# Patient Record
Sex: MALE | Race: WHITE
[De-identification: names, ages, dates, MRNs, and addresses within clinical notes are randomized per-mention and may not be internally consistent; named-entity substitution may affect disease eponyms.]

---

## 2017-11-14 ENCOUNTER — HOSPITAL ENCOUNTER (EMERGENCY)
Dept: HOSPITAL 63 - ER | Age: 47
Discharge: HOME | End: 2017-11-14
Payer: COMMERCIAL

## 2017-11-14 VITALS
SYSTOLIC BLOOD PRESSURE: 137 MMHG | DIASTOLIC BLOOD PRESSURE: 91 MMHG | SYSTOLIC BLOOD PRESSURE: 137 MMHG | DIASTOLIC BLOOD PRESSURE: 91 MMHG | SYSTOLIC BLOOD PRESSURE: 137 MMHG | DIASTOLIC BLOOD PRESSURE: 91 MMHG | DIASTOLIC BLOOD PRESSURE: 91 MMHG | SYSTOLIC BLOOD PRESSURE: 137 MMHG | DIASTOLIC BLOOD PRESSURE: 91 MMHG | SYSTOLIC BLOOD PRESSURE: 137 MMHG

## 2017-11-14 VITALS — WEIGHT: 200 LBS | HEIGHT: 72 IN | BODY MASS INDEX: 27.09 KG/M2

## 2017-11-14 DIAGNOSIS — J45.901: Primary | ICD-10-CM

## 2017-11-14 DIAGNOSIS — M25.552: ICD-10-CM

## 2017-11-14 DIAGNOSIS — G89.29: ICD-10-CM

## 2017-11-14 DIAGNOSIS — F17.210: ICD-10-CM

## 2017-11-14 PROCEDURE — 94640 AIRWAY INHALATION TREATMENT: CPT

## 2017-11-14 PROCEDURE — 99284 EMERGENCY DEPT VISIT MOD MDM: CPT

## 2017-11-14 NOTE — PHYS DOC
General


Chief Complaint:  MULTIPLE COMPLAINTS


Stated Complaint:  HIP PAIN  ASTHMA ATTACK


Time Seen by MD:  04:30


Source:  patient


Exam Limitations:  no limitations


Problems:  





History of Present Illness


Initial Comments


Patient is a 46-year-old male who comes to the ED complaining of asthma attack 

and hip pain.


Patient states he moved here from [place] in April, states he has not 

established with a doctor since moving here. He works as a  and cannot 

take sedative medications.


Patient has a nebulizer machine at home, states when his symptoms are like this 

he can usually get a breathing treatment which will help. He has no medications 

at home therefore he has come here for evaluation. He denies fever chills 

sweats or body aches cough is productive with whitish sputum. Symptoms are 

consistent with prior asthma attacks. Patient still smokes cigarettes heavily.


Patient also complains of left hip pain. While talking patient pushes on his 

left buttock and then cries out in pain. He denies any trauma or recent 

exacerbating activities. He says that he rolls over on his hip and it wakes him 

up at night. No numbness tingling weakness or radiating symptoms. No pre-

arrival treatment. ED vital signs are stable


Timing/Duration:  other


Severity:  mild


Modifying Factors:  improves with medication, worse with movement, improves 

with rest


Associated Symptoms:  cough, shortness of breath, other


Allergies:  


Coded Allergies:  


     No Known Drug Allergies (Unverified , 11/14/17)





Past Medical History


Medical History:  arthritis, asthma


Surgical History:  noncontributory





Social History


Smoker:  cigarettes


Alcohol:  none


Drugs:  none





Review of Systems


Constitutional:  denies chills, denies diaphoresis, denies fever


Respiratory:  see HPI


Cardiovascular:  denies chest pain, denies palpitations, denies syncope


Gastrointestinal:  denies diarrhea, denies nausea, denies vomiting


Musculoskeletal:  see HPI, denies back pain, joint pain, denies joint swelling, 

muscle pain, denies neck pain


Psychiatric/Neurological:  denies headache, denies numbness, denies paresthesia


Hematologic/Lymphatic:  denies blood clots, denies easy bleeding, denies easy 

bruising





Physical Exam


General Appearance:  no apparent distress (disheveled, smells like smoke)


Eyes:  bilateral eye normal inspection, bilateral eye PERRL, bilateral eye EOMI


Ear, Nose, Throat:  hearing grossly normal, normal ENT inspection, normal 

pharynx


Neck:  non-tender, supple


Respiratory:  chest non-tender, decreased breath sounds (globally with fair to 

good air movement no respiratory distress)


Cardiovascular:  normal peripheral pulses, regular rate, rhythm


Back:  no CVA tenderness, no vertebral tenderness


Extremities:  normal range of motion, no pedal edema, no calf tenderness, 

pelvis stable, other (tenderness at the lateral left gluteus muscle)


Neurologic/Psychiatric:  CNs II-XII nml as tested, no motor/sensory deficits, 

alert, normal mood/affect, oriented x 3


Skin:  normal color, warm/dry





Orders, Labs, Meds


DuoNeb improvement in patient's symptoms according to his statements.


I discussed over-the-counter medications smoking cessation establishment with 

PCP nebulizer treatments and prednisone. Prednisone should be effective in a 

heating patient's respiratory symptoms and chronic inflammatory left hip 

complaint. Signs and symptoms to monitor as well as urgent indications to 

return to the department discussed the patient's questions were answered to his 

satisfaction and he expressed agreement and understanding with the treatment 

plan


Departure


Time of Disposition:  04:56


Disposition:  01 HOME, SELF-CARE


Diagnosis:  COPD/asthma exacerbation, tobaccoism, chronic left


Condition:  GOOD


Patient Instructions:  Smoking Cessation, Tips For Success





Additional Instructions:  


Stop smoking seek medical assistance if necessary.


Drink plenty of fluids to avoid dehydration.


Over-the-counter Tylenol as needed.


Heating pad to left hip 20 minutes 4-6 times daily followed by gentle 

stretching.


Prescription: Prednisone, albuterol nebulizer


You will need to establish with and follow with a local primary care doctor. 


ED staff can give you names of local doctors who take walk-in patients. 


Follow-up with a doctor on Friday for recheck.


As discussed may consider physical therapy for further treatment of left 

buttock pain.


Return to ED with new or changing symptoms.











RONNIE LONGORIA DO Nov 14, 2017 04:58

## 2021-04-01 ENCOUNTER — HOSPITAL ENCOUNTER (OUTPATIENT)
Dept: HOSPITAL 63 - RAD | Age: 51
End: 2021-04-01
Attending: NURSE PRACTITIONER
Payer: COMMERCIAL

## 2021-04-01 DIAGNOSIS — M19.072: Primary | ICD-10-CM

## 2021-04-01 DIAGNOSIS — M77.32: ICD-10-CM

## 2021-04-01 DIAGNOSIS — M77.31: ICD-10-CM

## 2021-04-01 DIAGNOSIS — M19.071: ICD-10-CM

## 2021-04-01 PROCEDURE — 73610 X-RAY EXAM OF ANKLE: CPT

## 2021-04-01 NOTE — RAD
XR EXAM OF ANKLE 3V



History: Reason: ARTHRITIS / Spl. Instructions:  / History: 



Technique: 3 views bilateral ankles



Comparison: None.



Findings:

Left ankle: Normal alignment. Symmetric ankle mortise. Mild ankle degenerative changes. No fracture. 
Plantar calcaneal spur. Soft tissues unremarkable. Mild prominence of the anterior talus



Right ankle: Normal alignment. Symmetric ankle mortise. No fracture. Soft tissues unremarkable. Plant
ar calcaneal spur. Mild ankle DJD. Mild prominence of the anterior talus.



Impression: 

1.  Mild bilateral ankle DJD, left greater than right.



Electronically signed by: Trenton Yip DO (4/1/2021 5:30 PM) [id number]

## 2021-07-06 ENCOUNTER — HOSPITAL ENCOUNTER (OUTPATIENT)
Dept: HOSPITAL 61 - PF | Age: 51
End: 2021-07-06
Payer: COMMERCIAL

## 2021-07-06 DIAGNOSIS — M47.817: ICD-10-CM

## 2021-07-06 DIAGNOSIS — J44.9: ICD-10-CM

## 2021-07-06 DIAGNOSIS — M51.37: ICD-10-CM

## 2021-07-06 DIAGNOSIS — Z02.71: Primary | ICD-10-CM

## 2021-07-06 DIAGNOSIS — M25.78: ICD-10-CM

## 2021-07-06 PROCEDURE — 94729 DIFFUSING CAPACITY: CPT

## 2021-07-06 PROCEDURE — 94640 AIRWAY INHALATION TREATMENT: CPT

## 2021-07-06 PROCEDURE — 73560 X-RAY EXAM OF KNEE 1 OR 2: CPT

## 2021-07-06 PROCEDURE — 94060 EVALUATION OF WHEEZING: CPT

## 2021-07-06 PROCEDURE — 72100 X-RAY EXAM L-S SPINE 2/3 VWS: CPT

## 2021-07-06 NOTE — RAD
EXAM:  XR KNEE_LT 1-2 VIEWS, XR LUMBAR SPINE 2-3V 7/6/2021 9:59 AM



CLINICAL INDICATION:  Left knee pain, lower back pain



COMPARISON:  None



TECHNIQUE:  2 views of lumbar spine and 2 views of the left knee



FINDINGS:



Lumbar spine: There 5 nonrib-bearing lumbar vertebral bodies. No acute fracture. Alignment is normal.
 There is mild disc space during with anterior osteophytes at multiple levels. There is multilevel ad
vanced facet arthrosis, greatest at L4-L5 and L5-S1.



Left knee: No acute fracture. Alignment is normal. Joint spaces are maintained. No joint effusion.



IMPRESSION:

1. Mild degenerative disc disease. Multilevel facet arthrosis, greatest in the lower lumbar spine.



2. Normal left knee radiograph.



Electronically signed by: Delmis Michael MD (7/6/2021 4:12 PM) [id number]

## 2021-08-13 ENCOUNTER — HOSPITAL ENCOUNTER (OUTPATIENT)
Dept: HOSPITAL 63 - RAD | Age: 51
End: 2021-08-13
Payer: COMMERCIAL

## 2021-08-13 DIAGNOSIS — K44.9: ICD-10-CM

## 2021-08-13 DIAGNOSIS — K21.9: Primary | ICD-10-CM

## 2021-08-13 DIAGNOSIS — R13.10: ICD-10-CM

## 2021-08-13 PROCEDURE — 74220 X-RAY XM ESOPHAGUS 1CNTRST: CPT

## 2021-08-13 NOTE — RAD
Double contrast barium esophagram.



History:Reason: DYSPHAGIA ACID REFLUX   2.6 min of fluoro / Spl. Instructions:  / History: 



Comparison studies: None.



Technique: Esophagram was performed utilizing double contrast upright examination, single contrast pr
one examination, and cine evaluation of cervical esophageal swallow function.



Findings: Barium swallow was performed without difficulty. Esophageal peristalsis and motility were n
ormal. No mucosal abnormalities. Small hiatal hernia. No esophageal diverticulum. Mildly prominent cr
icopharyngeus muscle on initial sequence which relaxes on additional swallows. Prominent cervical syn
desmophytes with mild indention on the posterior esophagus. Mild esophageal dysmotility with mild pro
ximal escape and distal tertiary contractions.



Fluoroscopic time: 2.6 minutes

Fluoroscopic images: 40



IMPRESSION:

1.  Small hiatal hernia.

2.  Mild esophageal dysmotility.

3.  Prominent cervical syndesmophytes contributing to indention of the posterior esophagus. No stenos
is or obstruction.



Electronically signed by: Trenton Yip DO (8/13/2021 11:00 AM) [id number]

## 2022-02-09 ENCOUNTER — HOSPITAL ENCOUNTER (OUTPATIENT)
Dept: HOSPITAL 63 - US | Age: 52
End: 2022-02-09
Attending: NURSE PRACTITIONER
Payer: COMMERCIAL

## 2022-02-09 DIAGNOSIS — R16.0: Primary | ICD-10-CM

## 2022-02-09 DIAGNOSIS — K76.0: ICD-10-CM

## 2022-02-09 PROCEDURE — 76705 ECHO EXAM OF ABDOMEN: CPT

## 2022-02-09 NOTE — RAD
EXAM: Abdomen sonogram.



HISTORY: Elevated liver function laboratory values.



TECHNIQUE: Sonographic imaging of the abdomen was performed.



COMPARISON: None.



FINDINGS: The liver is enlarged. There is hepatic steatosis. No focal hepatic lesion is seen. The gal
lbladder is unremarkable. The common bile duct, pancreas and inferior vena cava are obscured. The rig
ht kidney is not well seen. The right kidney appears to be normal in size and there is no hydronephro
sis.



IMPRESSION:

1. Hepatomegaly and hepatic steatosis.

2. Limited evaluation of the midline and right upper abdominal structures due to the degree of aforem
entioned hepatic steatosis.



Electronically signed by: Ana Cristina Oscar MD (2/9/2022 10:40 AM) [id number]

## 2022-03-29 ENCOUNTER — HOSPITAL ENCOUNTER (OUTPATIENT)
Dept: HOSPITAL 63 - RAD | Age: 52
Discharge: HOME | End: 2022-03-29
Attending: NURSE PRACTITIONER
Payer: COMMERCIAL

## 2022-03-29 DIAGNOSIS — M25.861: ICD-10-CM

## 2022-03-29 DIAGNOSIS — M17.11: Primary | ICD-10-CM

## 2022-03-29 DIAGNOSIS — M25.862: ICD-10-CM

## 2022-03-29 DIAGNOSIS — M25.562: ICD-10-CM

## 2022-03-29 PROCEDURE — 73110 X-RAY EXAM OF WRIST: CPT

## 2022-03-31 NOTE — RAD
3 views the bilateral knees without comparison for wrist and knee pain, carpal tunnel syndrome.



FINDINGS: No fracture, dislocation, or acute osseous abnormality of either knee. No suprapatellar marivel
nt effusions. Early degenerative changes of the medial compartment of the right knee. No other signif
icant degenerative changes. Some vascular calcifications are seen bilaterally.



IMPRESSION:

1. No acute osseous abnormalities.



Electronically signed by: Moreno Nance MD (3/30/2022 10:35 AM) [id number]

## 2022-03-31 NOTE — RAD
XR LT WRIST 3VIEWS 



DATE: 3/29/2022 2:59 PM



INDICATION:  WRIST AND KNEE PAIN, CARPEL TUNNEL    



COMPARISON:  None.



FINDINGS: 



Bones: There is no evidence of acute fracture or dislocation. 



Joints: The joint spaces are normal.  



Miscellaneous: None.



IMPRESSION:  



Normal exam



Electronically signed by: Abdulaziz Galan MD (3/30/2022 10:10 AM) [id number]